# Patient Record
Sex: FEMALE | Race: BLACK OR AFRICAN AMERICAN | NOT HISPANIC OR LATINO
[De-identification: names, ages, dates, MRNs, and addresses within clinical notes are randomized per-mention and may not be internally consistent; named-entity substitution may affect disease eponyms.]

---

## 2018-08-10 ENCOUNTER — APPOINTMENT (OUTPATIENT)
Dept: DERMATOLOGY | Facility: CLINIC | Age: 48
End: 2018-08-10
Payer: COMMERCIAL

## 2018-08-10 VITALS
HEIGHT: 60 IN | SYSTOLIC BLOOD PRESSURE: 122 MMHG | DIASTOLIC BLOOD PRESSURE: 82 MMHG | BODY MASS INDEX: 40.44 KG/M2 | WEIGHT: 206 LBS

## 2018-08-10 DIAGNOSIS — L65.9 NONSCARRING HAIR LOSS, UNSPECIFIED: ICD-10-CM

## 2018-08-10 DIAGNOSIS — L30.9 DERMATITIS, UNSPECIFIED: ICD-10-CM

## 2018-08-10 PROCEDURE — 99203 OFFICE O/P NEW LOW 30 MIN: CPT

## 2018-08-10 RX ORDER — FLUOCINONIDE 0.5 MG/ML
0.05 SOLUTION TOPICAL
Qty: 1 | Refills: 1 | Status: ACTIVE | COMMUNITY
Start: 2018-08-10 | End: 1900-01-01

## 2018-08-10 RX ORDER — BETAMETHASONE DIPROPIONATE 0.5 MG/G
0.05 CREAM TOPICAL
Qty: 1 | Refills: 1 | Status: ACTIVE | COMMUNITY
Start: 2018-08-10 | End: 1900-01-01

## 2018-09-24 ENCOUNTER — APPOINTMENT (OUTPATIENT)
Dept: GASTROENTEROLOGY | Facility: CLINIC | Age: 48
End: 2018-09-24
Payer: COMMERCIAL

## 2018-09-24 VITALS
BODY MASS INDEX: 40.25 KG/M2 | WEIGHT: 205 LBS | TEMPERATURE: 98.5 F | SYSTOLIC BLOOD PRESSURE: 140 MMHG | DIASTOLIC BLOOD PRESSURE: 120 MMHG | HEIGHT: 60 IN

## 2018-09-24 DIAGNOSIS — Z12.11 ENCOUNTER FOR SCREENING FOR MALIGNANT NEOPLASM OF COLON: ICD-10-CM

## 2018-09-24 DIAGNOSIS — Z82.49 FAMILY HISTORY OF ISCHEMIC HEART DISEASE AND OTHER DISEASES OF THE CIRCULATORY SYSTEM: ICD-10-CM

## 2018-09-24 DIAGNOSIS — Z86.39 PERSONAL HISTORY OF OTHER ENDOCRINE, NUTRITIONAL AND METABOLIC DISEASE: ICD-10-CM

## 2018-09-24 DIAGNOSIS — Z80.0 FAMILY HISTORY OF MALIGNANT NEOPLASM OF DIGESTIVE ORGANS: ICD-10-CM

## 2018-09-24 DIAGNOSIS — Z86.79 PERSONAL HISTORY OF OTHER DISEASES OF THE CIRCULATORY SYSTEM: ICD-10-CM

## 2018-09-24 DIAGNOSIS — Z83.0 FAMILY HISTORY OF HUMAN IMMUNODEFICIENCY VIRUS [HIV] DISEASE: ICD-10-CM

## 2018-09-24 DIAGNOSIS — Z78.9 OTHER SPECIFIED HEALTH STATUS: ICD-10-CM

## 2018-09-24 DIAGNOSIS — Z87.09 PERSONAL HISTORY OF OTHER DISEASES OF THE RESPIRATORY SYSTEM: ICD-10-CM

## 2018-09-24 PROCEDURE — 99202 OFFICE O/P NEW SF 15 MIN: CPT

## 2018-09-24 RX ORDER — ALBUTEROL SULFATE 2.5 MG/3ML
(2.5 MG/3ML) VIAL, NEBULIZER (ML) INHALATION
Refills: 0 | Status: ACTIVE | COMMUNITY

## 2018-09-24 RX ORDER — ATENOLOL 100 MG/1
100 TABLET ORAL
Refills: 0 | Status: ACTIVE | COMMUNITY

## 2018-09-24 RX ORDER — SODIUM SULFATE, POTASSIUM SULFATE, MAGNESIUM SULFATE 17.5; 3.13; 1.6 G/ML; G/ML; G/ML
17.5-3.13-1.6 SOLUTION, CONCENTRATE ORAL
Qty: 1 | Refills: 0 | Status: ACTIVE | COMMUNITY
Start: 2018-09-24 | End: 1900-01-01

## 2018-09-24 RX ORDER — HYDROCHLOROTHIAZIDE 25 MG/1
25 TABLET ORAL
Refills: 0 | Status: ACTIVE | COMMUNITY

## 2018-09-24 RX ORDER — SIMVASTATIN 40 MG/1
40 TABLET, FILM COATED ORAL
Refills: 0 | Status: ACTIVE | COMMUNITY

## 2018-10-04 ENCOUNTER — APPOINTMENT (OUTPATIENT)
Dept: GASTROENTEROLOGY | Facility: AMBULATORY MEDICAL SERVICES | Age: 48
End: 2018-10-04
Payer: COMMERCIAL

## 2018-10-04 ENCOUNTER — OTHER (OUTPATIENT)
Age: 48
End: 2018-10-04

## 2018-10-04 PROCEDURE — 45380 COLONOSCOPY AND BIOPSY: CPT

## 2018-10-11 ENCOUNTER — OTHER (OUTPATIENT)
Age: 48
End: 2018-10-11

## 2018-10-13 ENCOUNTER — TRANSCRIPTION ENCOUNTER (OUTPATIENT)
Age: 48
End: 2018-10-13

## 2018-10-15 ENCOUNTER — TRANSCRIPTION ENCOUNTER (OUTPATIENT)
Age: 48
End: 2018-10-15

## 2021-07-19 ENCOUNTER — EMERGENCY (EMERGENCY)
Facility: HOSPITAL | Age: 51
LOS: 1 days | Discharge: HOME | End: 2021-07-19
Attending: EMERGENCY MEDICINE
Payer: COMMERCIAL

## 2021-07-19 VITALS
TEMPERATURE: 98 F | SYSTOLIC BLOOD PRESSURE: 172 MMHG | HEART RATE: 88 BPM | RESPIRATION RATE: 18 BRPM | OXYGEN SATURATION: 98 % | DIASTOLIC BLOOD PRESSURE: 100 MMHG

## 2021-07-19 VITALS
RESPIRATION RATE: 18 BRPM | DIASTOLIC BLOOD PRESSURE: 80 MMHG | OXYGEN SATURATION: 100 % | HEART RATE: 75 BPM | SYSTOLIC BLOOD PRESSURE: 145 MMHG | TEMPERATURE: 98 F

## 2021-07-19 DIAGNOSIS — Z90.711 ACQUIRED ABSENCE OF UTERUS WITH REMAINING CERVICAL STUMP: Chronic | ICD-10-CM

## 2021-07-19 DIAGNOSIS — Z98.89 OTHER SPECIFIED POSTPROCEDURAL STATES: Chronic | ICD-10-CM

## 2021-07-19 DIAGNOSIS — Z90.49 ACQUIRED ABSENCE OF OTHER SPECIFIED PARTS OF DIGESTIVE TRACT: Chronic | ICD-10-CM

## 2021-07-19 PROCEDURE — 99284 EMERGENCY DEPT VISIT MOD MDM: CPT

## 2021-07-19 PROCEDURE — 99283 EMERGENCY DEPT VISIT LOW MDM: CPT | Mod: 25

## 2021-07-19 PROCEDURE — 96372 THER/PROPH/DIAG INJ SC/IM: CPT

## 2021-07-19 RX ORDER — KETOROLAC TROMETHAMINE 30 MG/ML
30 SYRINGE (ML) INJECTION ONCE
Refills: 0 | Status: DISCONTINUED | OUTPATIENT
Start: 2021-07-19 | End: 2021-07-19

## 2021-07-19 RX ORDER — CYCLOBENZAPRINE HYDROCHLORIDE 10 MG/1
10 TABLET, FILM COATED ORAL ONCE
Refills: 0 | Status: COMPLETED | OUTPATIENT
Start: 2021-07-19 | End: 2021-07-19

## 2021-07-19 RX ORDER — CYCLOBENZAPRINE HYDROCHLORIDE 10 MG/1
1 TABLET, FILM COATED ORAL
Qty: 9 | Refills: 0
Start: 2021-07-19 | End: 2021-07-21

## 2021-07-19 RX ORDER — LIDOCAINE 4 G/100G
1 CREAM TOPICAL
Qty: 1 | Refills: 0
Start: 2021-07-19 | End: 2021-07-23

## 2021-07-19 RX ORDER — IBUPROFEN 200 MG
1 TABLET ORAL
Qty: 12 | Refills: 0
Start: 2021-07-19 | End: 2021-07-21

## 2021-07-19 RX ORDER — LIDOCAINE 4 G/100G
1 CREAM TOPICAL ONCE
Refills: 0 | Status: COMPLETED | OUTPATIENT
Start: 2021-07-19 | End: 2021-07-19

## 2021-07-19 RX ADMIN — Medication 30 MILLIGRAM(S): at 15:34

## 2021-07-19 RX ADMIN — CYCLOBENZAPRINE HYDROCHLORIDE 10 MILLIGRAM(S): 10 TABLET, FILM COATED ORAL at 14:34

## 2021-07-19 RX ADMIN — LIDOCAINE 1 PATCH: 4 CREAM TOPICAL at 14:34

## 2021-07-19 RX ADMIN — Medication 30 MILLIGRAM(S): at 14:34

## 2021-07-19 NOTE — ED PROVIDER NOTE - PHYSICAL EXAMINATION
GEN:   comfortable, in no apparent distress, AOx3  EYES:   PERRL, extra-occular movements intact  RESP:   clear to auscultation bilaterally, non-labored, speaking in full sentences  ABD:   soft, non tender, no guarding  :   no cva tenderness  MSK:   5/5 strength including lower legs, moving all extremities. positive left leg strait leg raise test.  SKIN:   dry, intact, no rash  NEURO:   AOx3, no focal weakness or loss of sensation, GCS 15  PSYCH: calm, cooperative, no apparent risk to self and others

## 2021-07-19 NOTE — ED PROVIDER NOTE - PATIENT PORTAL LINK FT
You can access the FollowMyHealth Patient Portal offered by [facility] by registering at the following website: [email or web address]. By joining [employer]’s FollowMyHealth portal, you will also be able to view your health information using other applications (apps) compatible with our system.

## 2021-07-19 NOTE — ED PROVIDER NOTE - CLINICAL SUMMARY MEDICAL DECISION MAKING FREE TEXT BOX
51 year old female history of htn, hld, DM, chronic low back pain presents for low back pain.  No red flags.  Meds, reassess, outpatient f/u.

## 2021-07-19 NOTE — ED PROVIDER NOTE - OBJECTIVE STATEMENT
51 year old female history of htn, hld, DM, chronic low back pain presents for low back pain.  Patient reports that it started at 1000 this morning and radiates down b/l posterior legs / buttocks, worse on left.  States she applied heat and took tylenol prior to arrival without much relief.  Patient denies recent heavy lifting or recent trauma.  Patient denies numbness in extremities, denies loss of bladder of bowel function, denies fevers or chills and denies saddle anesthesia, denies IVDU.  States feels similar to previous exacerbations of her lower back pain.

## 2021-07-19 NOTE — ED PROVIDER NOTE - PROGRESS NOTE DETAILS
Patient reports great improvement in pain, is able to ambulate with steady gait.  Patient nontoxic and medically stable for discharge. Return precautions provided and patient understands to return to the ED for concerning or worsening signs and symptoms. Instructed to follow up with spine and agreeable. Patient's questions answered.

## 2021-07-19 NOTE — ED PROVIDER NOTE - ATTENDING CONTRIBUTION TO CARE
seen with acp  c/o low back pain radiating down the left leg  able to ambulate  improved with toradol and cyclo benzaprine  agree with acps assessment hx and physical and disposition

## 2021-07-19 NOTE — ED PROVIDER NOTE - CARE PROVIDER_API CALL
Dean Lam (MD)  Orthopaedic Surgery  [address], [address]  [place] [address]  Phone: [phone]  Fax: [phone]  Follow Up Time:     John Justin; JOHANA; MS)  Neurosurgery  [address], [address]  [place] [address]  Phone: [phone]  Fax: [phone]  Follow Up Time:

## 2022-04-05 NOTE — ED ADULT TRIAGE NOTE - RESPIRATORY RATE (BREATHS/MIN)
Pt has had a hemodialysis cath in left arm since 2021; removed approx 2-3 months ago due to nonfunctioning of; per pt dialysis just started taking kaykay in that arm;  Pt unaware of what was pulled in dialysis today; 18

## 2023-07-19 PROBLEM — E11.9 TYPE 2 DIABETES MELLITUS WITHOUT COMPLICATIONS: Chronic | Status: ACTIVE | Noted: 2021-07-19

## 2023-07-19 PROBLEM — E78.5 HYPERLIPIDEMIA, UNSPECIFIED: Chronic | Status: ACTIVE | Noted: 2021-07-19

## 2023-07-19 PROBLEM — I10 ESSENTIAL (PRIMARY) HYPERTENSION: Chronic | Status: ACTIVE | Noted: 2021-07-19

## 2023-07-29 ENCOUNTER — NON-APPOINTMENT (OUTPATIENT)
Age: 53
End: 2023-07-29

## 2023-07-31 ENCOUNTER — APPOINTMENT (OUTPATIENT)
Dept: ORTHOPEDIC SURGERY | Facility: CLINIC | Age: 53
End: 2023-07-31
Payer: COMMERCIAL

## 2023-07-31 ENCOUNTER — NON-APPOINTMENT (OUTPATIENT)
Age: 53
End: 2023-07-31

## 2023-07-31 VITALS
BODY MASS INDEX: 36.52 KG/M2 | HEART RATE: 82 BPM | WEIGHT: 186 LBS | DIASTOLIC BLOOD PRESSURE: 88 MMHG | HEIGHT: 60 IN | SYSTOLIC BLOOD PRESSURE: 149 MMHG

## 2023-07-31 PROCEDURE — 72170 X-RAY EXAM OF PELVIS: CPT

## 2023-07-31 PROCEDURE — 99204 OFFICE O/P NEW MOD 45 MIN: CPT

## 2023-07-31 PROCEDURE — 72110 X-RAY EXAM L-2 SPINE 4/>VWS: CPT

## 2023-08-01 RX ORDER — METFORMIN HYDROCHLORIDE 500 MG/1
500 TABLET, COATED ORAL
Qty: 180 | Refills: 0 | Status: ACTIVE | COMMUNITY
Start: 2022-11-26

## 2023-08-01 RX ORDER — COVID-19 MOLECULAR TEST ASSAY
KIT MISCELLANEOUS
Qty: 8 | Refills: 0 | Status: ACTIVE | COMMUNITY
Start: 2023-03-16

## 2023-08-01 RX ORDER — LOSARTAN POTASSIUM 100 MG/1
100 TABLET, FILM COATED ORAL
Qty: 90 | Refills: 0 | Status: ACTIVE | COMMUNITY
Start: 2023-06-27

## 2023-08-01 RX ORDER — ATORVASTATIN CALCIUM 40 MG/1
40 TABLET, FILM COATED ORAL
Qty: 90 | Refills: 0 | Status: ACTIVE | COMMUNITY
Start: 2023-02-08

## 2023-08-01 RX ORDER — METFORMIN ER 500 MG 500 MG/1
500 TABLET ORAL
Qty: 180 | Refills: 0 | Status: ACTIVE | COMMUNITY
Start: 2023-03-16

## 2023-08-01 RX ORDER — BLOOD SUGAR DIAGNOSTIC
STRIP MISCELLANEOUS
Qty: 50 | Refills: 0 | Status: ACTIVE | COMMUNITY
Start: 2022-11-28

## 2023-08-01 RX ORDER — AMLODIPINE BESYLATE 10 MG/1
10 TABLET ORAL
Qty: 90 | Refills: 0 | Status: ACTIVE | COMMUNITY
Start: 2023-05-03

## 2023-08-01 RX ORDER — SITAGLIPTIN 100 MG/1
100 TABLET, FILM COATED ORAL
Qty: 90 | Refills: 0 | Status: ACTIVE | COMMUNITY
Start: 2023-04-23

## 2023-08-01 RX ORDER — SEMAGLUTIDE 0.68 MG/ML
2 INJECTION, SOLUTION SUBCUTANEOUS
Qty: 9 | Refills: 0 | Status: ACTIVE | COMMUNITY
Start: 2023-03-16

## 2023-08-01 RX ORDER — CHLORTHALIDONE 25 MG/1
25 TABLET ORAL
Qty: 90 | Refills: 0 | Status: ACTIVE | COMMUNITY
Start: 2023-02-16

## 2023-08-01 RX ORDER — GLIPIZIDE 5 MG/1
5 TABLET, FILM COATED, EXTENDED RELEASE ORAL
Qty: 90 | Refills: 0 | Status: ACTIVE | COMMUNITY
Start: 2023-04-15

## 2023-08-01 NOTE — PHYSICAL EXAM
[LE] : Sensory: Intact in bilateral lower extremities [Knee] : patellar 2+ and symmetric bilaterally [Ankle] : ankle 2+ and symmetric bilaterally [DP] : dorsalis pedis 2+ and symmetric bilaterally [Normal RLE] : Right Lower Extremity: No scars, rashes, lesions, ulcers, skin intact [Normal LLE] : Left Lower Extremity: No scars, rashes, lesions, ulcers, skin intact [Normal DTR Reflexes] : DTR reflexes normal [Normal Touch] : sensation intact for touch [Normal] : Gait: normal [Poor Appearance] : well-appearing [Acute Distress] : not in acute distress [Obese] : not obese [de-identified] : Negative FAbERs tenderness over the left greater trochanteric bursa [de-identified] : Heel and toe walk is intact Negative tension signs of bilateral LEs [de-identified] : xrays of the lumbar spine 4 views demonstrating good alignment on the AP view, lateral views of flexion and extension with no instability seen,  no obvious fractures, no bony tumors and no infection.  AP pelvis x-ray 1 view demonstrating well-preserved bilateral hip joints no obvious fractures.

## 2023-08-01 NOTE — DISCUSSION/SUMMARY
[Medication Risks Reviewed] : Medication risks reviewed [Surgical risks reviewed] : Surgical risks reviewed [2 Weeks] : in 2 weeks [de-identified] : Since patient has already undergone physical therapy recently and continues to do home guided exercises. Updated prescription for an MRI of the lumbar spine provided on today's office visit.  I, Dr. Og Lee, personally performed the evaluation and management (E/M) services for this new patient.  That E/M includes conducting the initial examination, assessing all conditions, and establishing a plan of care.  Today, my ACP, Katie Fisher, was here to observe my evaluation and management services for this patient to be followed going forward.
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
## 2023-09-09 ENCOUNTER — APPOINTMENT (OUTPATIENT)
Dept: ORTHOPEDIC SURGERY | Facility: CLINIC | Age: 53
End: 2023-09-09
Payer: COMMERCIAL

## 2023-09-09 VITALS — SYSTOLIC BLOOD PRESSURE: 147 MMHG | HEART RATE: 75 BPM | DIASTOLIC BLOOD PRESSURE: 90 MMHG

## 2023-09-09 PROCEDURE — 99214 OFFICE O/P EST MOD 30 MIN: CPT

## 2023-09-09 NOTE — HISTORY OF PRESENT ILLNESS
[Intermit.] : ~He/She~ states the symptoms seem to be intermittent [de-identified] : Patient presents a follow-up visit to review the results of a recent MRI of the Lumbar Spine. The patient continues to have pain pertaining to her lower back, mostly to her left side. The patient reports no significant changes to their symptoms since their last visit. The patient denies any symptoms of numbness, tingling, or weakness. She notes that her left knee has been buckling but has no falls reported. She also notes some stiffness. The patient currently takes no pain medication at this time.

## 2023-09-09 NOTE — ADDENDUM
[FreeTextEntry1] : I, Shine Pina Jr, acted solely as a scribe for Dr. Og Lee on this date 09/09/2023 .  All medical record entries made by the Scribe were at my, Dr. Og Lee, direction and personally dictated by me on 09/09/2023 . I have reviewed the chart and agree that the record accurately reflects my personal performance of the history, physical exam, assessment and plan. I have also personally directed, reviewed, and agreed with the chart.

## 2023-09-09 NOTE — DISCUSSION/SUMMARY
[Medication Risks Reviewed] : Medication risks reviewed [PRN] : PRN [de-identified] : I discussed the underlying pathophysiology of the patient's condition & MRI findings. I expressed to the patient that her symptoms are consistent with osteoarthritis across her lower back. Patient is not a surgical candidate at this time. The patient and I discussed her options regarding treatment. I advised that the patient should consult with a pain management doctor for alleviation of her symptoms. We discussed the patient's issues and talked about the benefits and risks of the injections. A prescription to a pain management specialist was provided to the patient. If the patient begins to experience any changes or severe exacerbation of her symptoms, she should reach out to me as soon as possible. Otherwise, She should return as needed.

## 2023-09-09 NOTE — PHYSICAL EXAM
[LE] : Sensory: Intact in bilateral lower extremities [Normal] : No costovertebral angle tenderness and no spinal tenderness [de-identified] : MRI Evaluation of the Lumbar Spine performed on 9/4/23 shows L3-4, L4-5, & L5-S1 disc herniations deforming the thecal sac abutting the proximal nerve roots bilaterally with bilateral neural foraminal extension also noted at each level abutting the exiting left L5 nerve root at the L5-S1 level, abutting the L4 nerve roots bilaterally at the L4-5 level with L4-5 moderate central spinal stenosis in conjunction with bilateral facet and ligamentous hypertrophy and Grade I Spondylolisthesis. T12-L1 disc bulge. Lumbar Spine curvature with leftward convexity.

## 2023-10-25 ENCOUNTER — APPOINTMENT (OUTPATIENT)
Dept: PAIN MANAGEMENT | Facility: CLINIC | Age: 53
End: 2023-10-25
Payer: COMMERCIAL

## 2023-10-25 VITALS — BODY MASS INDEX: 37.11 KG/M2 | WEIGHT: 189 LBS | HEIGHT: 60 IN

## 2023-10-25 PROCEDURE — 99214 OFFICE O/P EST MOD 30 MIN: CPT

## 2023-11-14 ENCOUNTER — APPOINTMENT (OUTPATIENT)
Dept: PAIN MANAGEMENT | Facility: CLINIC | Age: 53
End: 2023-11-14
Payer: COMMERCIAL

## 2023-11-14 PROCEDURE — 64483 NJX AA&/STRD TFRM EPI L/S 1: CPT | Mod: LT

## 2023-11-14 PROCEDURE — J3490M: CUSTOM

## 2023-11-14 PROCEDURE — 64484 NJX AA&/STRD TFRM EPI L/S EA: CPT | Mod: 59,LT

## 2023-11-29 ENCOUNTER — APPOINTMENT (OUTPATIENT)
Dept: PAIN MANAGEMENT | Facility: CLINIC | Age: 53
End: 2023-11-29
Payer: COMMERCIAL

## 2023-11-29 VITALS — HEIGHT: 60 IN | BODY MASS INDEX: 37.11 KG/M2 | WEIGHT: 189 LBS

## 2023-11-29 PROCEDURE — 99213 OFFICE O/P EST LOW 20 MIN: CPT

## 2024-03-25 ENCOUNTER — APPOINTMENT (OUTPATIENT)
Dept: PAIN MANAGEMENT | Facility: CLINIC | Age: 54
End: 2024-03-25
Payer: COMMERCIAL

## 2024-03-25 VITALS — BODY MASS INDEX: 37.11 KG/M2 | WEIGHT: 189 LBS | HEIGHT: 60 IN

## 2024-03-25 PROCEDURE — 99214 OFFICE O/P EST MOD 30 MIN: CPT

## 2024-03-25 NOTE — PHYSICAL EXAM
[de-identified] : Gen: NAD Head: NC/AT Eyes: no glasses, no scleral icterus ENT: mucous membranes moist CV: No JVD Lungs: nonlabored breathing Abd: soft, NT/ND Ext: full ROM in all extremities, no peripheral edema Back: +TTP in the bilateral low lumbar facet region, +SLR bilaterally, very limited extension 2/2 pain Neuro: CN intact LEs +5 L +5 R hip flexion +5 L +5 R leg extension +5 L +5 R leg flexion +5 L +5 R foot dorsiflexion +5 L +5 R foot plantarflexion +5 L +5 R EHL extension Psych: normal affect Skin: no visible lesions

## 2024-03-25 NOTE — HISTORY OF PRESENT ILLNESS
[Lower back] : lower back [Left Leg] : left leg [Sudden] : sudden [9] : 9 [Burning] : burning [Radiating] : radiating [Household chores] : household chores [Constant] : constant [Leisure] : leisure [Work] : work [Injection therapy] : injection therapy [Sitting] : sitting [Standing] : standing [Walking] : walking [Bending forward] : bending forward [Extending back] : extending back [Exercising] : exercising [Stairs] : stairs [Full time] : Work status: full time [FreeTextEntry1] : 3/25/2024: TIMOTEO MATA is a 54 year-old woman presenting for a NPV (Dr. Suero) for a history of chronic low back pain with radicular features. The patient notes a history of low back pain starting in July of 2023. The patient underwent a LEFT L4-L5, L5-S1 TFESI on 11/14/2023 which helped significantly with her low back and lower extremity pain. She notes 80% relief of pain and improvement in function for approximately 3+ months. She was able to return to normal function. However, approximately two weeks ago, she notes having recurrence of an aching pain in the low lumbar region with radiation to the bilateral anterolateral thighs to the anterior legs. No focal numbness or weakness in the lower extremities. No bowel or bladder incontinence or saddle anesthesia.  Pain is worse with standing and walking for long periods as well as transitioning from sitting to standing. She has been taking ibuprofen prn for her pain.  The patient states that average pain over the past week was 9/10 in severity. Mood: Patient notes having a history of depression/anxiety but is not actively depressed. Sleep: Patient notes difficulty with sleep 2/2 pain.    Dr. Suero: Interval HPI 11/29/2023 Pt returns s/p Left L4-L5, L5-S1 TFESI on 11/14/23 with 80% relief and improvement of ADLs. Patient denies loss of bowel/bladder function, new motor deficits, fevers, or chills.  Initial HPI 10/25/23 Ms. MATA is a 53 year old F who presents for initial evaluation for low back and leg pain. Pain started 8 weeks ago and pain rated 7/10 or higher. It is not associated with any inciting injury. Pain radiates to down the L leg. Pain is described as shooting and electric shock when radiating. Pain is worsened with sitting, coughing and bearing down. Patient has failed conservative treatment with acetaminophen, NSAIDs, muscle relaxants, and physical therapy/HEP. Pain is causing significant functional limitation resulting in diminished quality of life and impaired age appropriate ADL's. Patient denies loss of bowel/bladder function, new motor deficits, fevers, or chills. There is associated numbness/tingling. [] : no [FreeTextEntry7] : Left leg

## 2024-03-25 NOTE — DATA REVIEWED
[MRI] : MRI [Lumbar Spine] : lumbar spine [Report was reviewed and noted in the chart] : The report was reviewed and noted in the chart [I independently reviewed and interpreted images and report] : I independently reviewed and interpreted images and report [FreeTextEntry1] : 8/26/2023 (STAND UP) INTERPRETATION: At the L5/S1 disc space level, disc herniation is noted deforming the thecal sac abutting the proximal S1 nerve roots bilaterally with bilateral neural foraminal extension, left greater than right, abutting the exiting left L5 nerve root. Signal elevation is noted in the posterior margin of annulus fibrosus associated with annular fissure. Bilateral facet and ligamentous hypertrophy is noted. Loss of disc signal with preservation of disc space height.  At L4/5, disc herniation is noted with Grade I spondylolisthesis deforming the thecal sac abutting the proximal L5 nerve roots bilaterally with bilateral neural foraminal extension abutting the exiting L4 nerve roots contributing to moderate central spinal stenosis in conjunction with bilateral facet and ligamentous hypertrophy. There is no evidence of bone marrow edema identified within the pars regions bilaterally to indicate an acute process. Partial loss of disc signal with preservation of disc space height.  At L3/4, disc herniation is noted deforming the thecal sac abutting the proximal L4 nerve roots bilaterally with bilateral inferior neural foraminal extension. Loss of disc signal is noted with preservation of disc space height. Signal elevation is noted in the posterior margin of annulus fibrosus associated with annular fissure.  At L2/3, there is no evidence of herniated disc, spinal canal compromise, neural foraminal stenosis, lateral recess encroachment, or loss of disc space height or signal.  At L1/2, there is no evidence of herniated disc, spinal canal compromise, neural foraminal stenosis, lateral recess encroachment, or loss of disc space height or signal.  At T12/L1, disc bulge is noted deforming the thecal sac. There is no evidence of neural foraminal extension. Preservation of disc space height and signal is noted.  There is only limited assessment provided of the T11/12 and T10/11 disc spaces at peripheral margin of included field-of-view.  Lumbar spine curvature is noted with leftward convexity.  Nonspecific dependent soft tissue edema is noted in the posterior subcutaneous tissues.  Hemangiomata are noted within the T12 and L1 vertebral bodies.  There is no evidence of lumbar vertebral body compression fracture. There is no evidence of bone marrow infiltrative disorder. There is no evidence of signal abnormality within the conus medullaris which is located at the approximate T12/L1 disc space level.  IMPRESSION:  * L3/4, L4/5, and L5/S1 disc herniations deforming the thecal sac abutting the proximal nerve roots bilaterally with bilateral neural foraminal extension also noted at each level abutting the exiting left L5 nerve root at the L5/S1 level, abutting the exiting L4 nerve roots bilaterally at the L4/5 level with L4/5 moderate central spinal stenosis in conjunction with bilateral facet and ligamentous hypertrophy and Grade I spondylolisthesis.  * T12/L1 disc bulge.  * Lumbar spine curvature with leftward convexity.

## 2024-03-25 NOTE — DISCUSSION/SUMMARY
[de-identified] : TIMOTEO MATA is a 54 year-old woman presenting for a NPV for a history of chronic low back pain with radicular features.  Prior treatment: 11/14/2023: LEFT L4-L5, L5-S1 TFESI w/ 80% relief of pain and improvement in function Physical therapy x2 months OTC NSAIDs Rest, heat, ice Patient has participated and failed at least 6 weeks of conservative therapy including physical therapy and a prescribed home exercise program as well as 6 weeks of activity modifications, including heat, ice, rest, and over the counter medications.  Plan: 1) MRI lumbar spine images reviewed with the patient. 2) Schedule BILATERAL L3-L4 TFESI w/o MAC. The procedure was explained to the patient in detail. Reviewed risks, benefits, and alternatives with the patient. Some risks discussed included temporary increase in pain, bleeding, infection, and side effects from steroids. The patient expressed understanding and would like to proceed.  3) Trial celecoxib 200mg BID prn; Discussed the risks of NSAIDs, including worsening HTN, cardiovascular risks, GI risk, renal injury. The patient was told not to take other NSAIDs with this and to consult with their PCP if there is a significant medical history to warrant this prior to initiating treatment.  4) Trial methocarbamol 750mg BID prn; Discussed AEs, including sedation, dizziness, somnolence. Patient told to use caution when driving after taking the first few doses. 5) Continue home exercise regimen 6) RTC post procedure

## 2024-03-26 RX ORDER — METHOCARBAMOL 750 MG/1
750 TABLET, FILM COATED ORAL
Qty: 60 | Refills: 1 | Status: ACTIVE | COMMUNITY
Start: 2024-03-25 | End: 1900-01-01

## 2024-04-05 ENCOUNTER — APPOINTMENT (OUTPATIENT)
Dept: PAIN MANAGEMENT | Facility: CLINIC | Age: 54
End: 2024-04-05
Payer: COMMERCIAL

## 2024-04-05 PROCEDURE — 64483 NJX AA&/STRD TFRM EPI L/S 1: CPT | Mod: 50

## 2024-04-05 NOTE — PROCEDURE
[FreeTextEntry1] : BILATERAL L3-L4 transforaminal epidural steroid injection [FreeTextEntry2] : chronic low back pain with radicular features [FreeTextEntry3] : Procedure Date: 04/05/2024   Preoperative Diagnosis: chronic low back pain with bilateral sciatica   Procedure: BILATERAL L3-L4 transforaminal epidural steroid injection under fluoroscopic guidance   Anesthesia: local   Complications: none   EBL: none   Procedure in detail: Patient was seen and examined. Risks, benefits, and alternatives for the procedure were discussed with the patient in detail. The patient expressed understanding, gave written and verbal consent, and placed themselves in a prone position on the procedure table. Skin overlying the lumbosacral spine was prepped with chloraprep and draped in the usual sterile fashion. Fluoroscopic images were obtained to identify the L3 vertebral body. Target was the 6 o'clock position under the right and left pedicle at the L3 vertebral body. Skin overlying the targets was marked and infiltrated with 1% lidocaine. Using two 22 gauge, 5 inch spinal needles, these were inserted and advanced under intermittent fluoroscopic guidance. When felt to be engaged in the epidural space, lateral view was used to confirm depth. After negative aspiration for heme/csf, contrast was injected under live fluoroscopy through each needle, both of which showed contrast spread consistent with epidural flow. No evidence of intravascular or intrathecal uptake. At this point, a total of 2ml of injectate, which consisted of 1ml of 10mg/ml dexamethasone and 1ml of normal saline was injected through each needle. The needles were restyletted and withdrawn, a band aid was placed over the injection site. Patient tolerated the procedure well. The patient recovered uneventfully and was discharged home in stable condition.

## 2024-04-18 ENCOUNTER — APPOINTMENT (OUTPATIENT)
Dept: PAIN MANAGEMENT | Facility: CLINIC | Age: 54
End: 2024-04-18
Payer: COMMERCIAL

## 2024-04-18 VITALS — WEIGHT: 189 LBS | HEIGHT: 60 IN | BODY MASS INDEX: 37.11 KG/M2

## 2024-04-18 PROCEDURE — 99214 OFFICE O/P EST MOD 30 MIN: CPT

## 2024-04-18 NOTE — DISCUSSION/SUMMARY
[de-identified] : TIMOTEO MATA is a 54 year-old woman presenting for a RPV for a history of chronic low back pain with radicular features.  Prior treatment: 4/5/2024: BILATERAL L3-L4 TFESI w/ 50% improvement of pain and function 11/14/2023: LEFT L4-L5, L5-S1 TFESI w/ 80% relief of pain and improvement in function Physical therapy x2 months OTC NSAIDs Rest, heat, ice Patient has participated and failed at least 6 weeks of conservative therapy including physical therapy and a prescribed home exercise program as well as 6 weeks of activity modifications, including heat, ice, rest, and over the counter medications.  Plan: 1) MRI lumbar spine images reviewed with the patient. 2) Schedule LEFT L5-S1, S1 TFESI w/o MAC. The procedure was explained to the patient in detail. Reviewed risks, benefits, and alternatives with the patient. Some risks discussed included temporary increase in pain, bleeding, infection, and side effects from steroids. The patient expressed understanding and would like to proceed.  3) Continue celecoxib 200mg BID prn; patient notes that this does help with pain and denies AEs 4) Continue methocarbamol 750mg BID prn; denies AEs 5) Continue home exercise regimen 6) RTC post procedure

## 2024-04-18 NOTE — PHYSICAL EXAM
[de-identified] : Gen: NAD Head: NC/AT Eyes: no glasses, no scleral icterus ENT: mucous membranes moist CV: No JVD Lungs: nonlabored breathing Abd: soft, NT/ND Ext: full ROM in all extremities, no peripheral edema Back: +TTP in the bilateral low lumbar facet region, +SLR on the LEFT, very limited extension 2/2 pain Neuro: CN intact LEs +5 L +5 R hip flexion +5 L +5 R leg extension +5 L +5 R leg flexion +5 L +5 R foot dorsiflexion +5 L +5 R foot plantarflexion +5 L +5 R EHL extension Psych: normal affect Skin: no visible lesions

## 2024-04-18 NOTE — HISTORY OF PRESENT ILLNESS
[Lower back] : lower back [Left Leg] : left leg [Sudden] : sudden [9] : 9 [Burning] : burning [Radiating] : radiating [Constant] : constant [Household chores] : household chores [Leisure] : leisure [Work] : work [Injection therapy] : injection therapy [Sitting] : sitting [Standing] : standing [Walking] : walking [Bending forward] : bending forward [Extending back] : extending back [Exercising] : exercising [Stairs] : stairs [Full time] : Work status: full time [FreeTextEntry1] : 4/18/2024: TIMOTEO MATA is a 54 year-old woman presenting for a RPV for a history of chronic low back pain with radicular features. The patient underwent a BILATERAL L3-L4 TFESI w/o MAC on 4/5/2024. She was also prescribed celecoxib and methocarbamol. The patient notes that she has had 50% relief of pain in the back and lower extremities since the procedures. She has had some improvement of function since the procedure. The patient describes a pain in the left low lumbosacral region with radiation to the left gluteal region and posterior thigh and posterolateral leg to the lateral foot. She notes intermittent tingling and numbness over this distribution with no focal weakness. No bowel or bladder incontinence or saddle anesthesia.  The patient notes that she has increased pain with sitting for 10-15 minutes and needs to stand up and walk around to alleviate her pain.  The patient states that average pain over the past week was 9/10 in severity. Mood: Patient denies anxiety/depression at this time.  Sleep: Patient denies difficulty with sleep 2/2 pain.   3/25/2024: TIMOTEO MATA is a 54 year-old woman presenting for a NPV (Dr. Suero) for a history of chronic low back pain with radicular features. The patient notes a history of low back pain starting in July of 2023. The patient underwent a LEFT L4-L5, L5-S1 TFESI on 11/14/2023 which helped significantly with her low back and lower extremity pain. She notes 80% relief of pain and improvement in function for approximately 3+ months. She was able to return to normal function. However, approximately two weeks ago, she notes having recurrence of an aching pain in the low lumbar region with radiation to the bilateral anterolateral thighs to the anterior legs. No focal numbness or weakness in the lower extremities. No bowel or bladder incontinence or saddle anesthesia.  Pain is worse with standing and walking for long periods as well as transitioning from sitting to standing. She has been taking ibuprofen prn for her pain.  The patient states that average pain over the past week was 9/10 in severity. Mood: Patient notes having a history of depression/anxiety but is not actively depressed. Sleep: Patient notes difficulty with sleep 2/2 pain.    Dr. Suero: Interval HPI 11/29/2023 Pt returns s/p Left L4-L5, L5-S1 TFESI on 11/14/23 with 80% relief and improvement of ADLs. Patient denies loss of bowel/bladder function, new motor deficits, fevers, or chills.  Initial HPI 10/25/23 Ms. MATA is a 53 year old F who presents for initial evaluation for low back and leg pain. Pain started 8 weeks ago and pain rated 7/10 or higher. It is not associated with any inciting injury. Pain radiates to down the L leg. Pain is described as shooting and electric shock when radiating. Pain is worsened with sitting, coughing and bearing down. Patient has failed conservative treatment with acetaminophen, NSAIDs, muscle relaxants, and physical therapy/HEP. Pain is causing significant functional limitation resulting in diminished quality of life and impaired age appropriate ADL's. Patient denies loss of bowel/bladder function, new motor deficits, fevers, or chills. There is associated numbness/tingling. [] : no [FreeTextEntry7] : Left leg

## 2024-05-14 ENCOUNTER — APPOINTMENT (OUTPATIENT)
Dept: PAIN MANAGEMENT | Facility: CLINIC | Age: 54
End: 2024-05-14
Payer: COMMERCIAL

## 2024-05-14 PROCEDURE — 64484 NJX AA&/STRD TFRM EPI L/S EA: CPT | Mod: LT

## 2024-05-14 PROCEDURE — 64483 NJX AA&/STRD TFRM EPI L/S 1: CPT | Mod: LT

## 2024-05-14 NOTE — PROCEDURE
[FreeTextEntry1] : L5-S1, S1 transforaminal epidural steroid injection [FreeTextEntry2] : chronic lumbar radiculopathy [FreeTextEntry3] : Procedure Date: 05/14/2024   Preoperative Diagnosis: chronic low back pain with LEFT sided sciatica   Procedure: LEFT L5-S1, S1 transforaminal epidural steroid injection under fluoroscopic guidance   Anesthesia: local   Complications: none   EBL: none   Procedure in detail: Patient was seen and examined. Risks, benefits, and alternatives for the procedure were discussed with the patient in detail. The patient expressed understanding, gave written and verbal consent, and placed themselves in a prone position on the procedure table. Skin overlying the lumbosacral spine was prepped with chloraprep and draped in the usual sterile fashion. Fluoroscopic images were obtained to identify the L5 and S1 vertebral body. Target was the 6 o'clock position under the LEFT pedicle at both the L5 and S1 vertebral bodies. Skin overlying the targets was marked and infiltrated with 1% lidocaine. Using two 25 gauge, 5 inch spinal needles, these were inserted and advanced under intermittent fluoroscopic guidance. When felt to be engaged in the epidural space, lateral view was used to confirm depth. After negative aspiration for heme/csf, contrast was injected under live fluoroscopy through each needle, both of which showed contrast spread consistent with epidural flow. No evidence of intravascular or intrathecal uptake. At this point, a total of 2ml of injectate, which consisted of 1ml of 6mg/ml betamethasone and 1ml of normal saline was injected through each needle. The needles were restyletted and withdrawn, a band aid was placed over the injection site. Patient tolerated the procedure well. The patient recovered uneventfully and was discharged home in stable condition.

## 2024-05-20 ENCOUNTER — RX RENEWAL (OUTPATIENT)
Age: 54
End: 2024-05-20

## 2024-05-28 PROBLEM — M54.50 ACUTE LOW BACK PAIN, UNSPECIFIED BACK PAIN LATERALITY, UNSPECIFIED WHETHER SCIATICA PRESENT: Status: ACTIVE | Noted: 2023-07-31

## 2024-05-30 ENCOUNTER — APPOINTMENT (OUTPATIENT)
Dept: PAIN MANAGEMENT | Facility: CLINIC | Age: 54
End: 2024-05-30
Payer: COMMERCIAL

## 2024-05-30 VITALS — WEIGHT: 188 LBS | HEIGHT: 60 IN | BODY MASS INDEX: 36.91 KG/M2

## 2024-05-30 DIAGNOSIS — M54.50 LOW BACK PAIN, UNSPECIFIED: ICD-10-CM

## 2024-05-30 PROCEDURE — 99214 OFFICE O/P EST MOD 30 MIN: CPT

## 2024-05-30 NOTE — DISCUSSION/SUMMARY
[de-identified] : TIMOTEO MATA is a 54 year-old woman presenting for a RPV for a history of chronic low back pain with radicular features.  Prior treatment: 5/14/2024 LEFT L5-S1, S1 TFESI w/o MAC 60% improvement of pain and function 4/5/2024: BILATERAL L3-L4 TFESI w/ 50% improvement of pain and function 11/14/2023: LEFT L4-L5, L5-S1 TFESI w/ 80% relief of pain and improvement in function Physical therapy x2 months OTC NSAIDs Rest, heat, ice Patient has participated and failed at least 6 weeks of conservative therapy including physical therapy and a prescribed home exercise program as well as 6 weeks of activity modifications, including heat, ice, rest, and over the counter medications.  Plan: 1) MRI lumbar spine images reviewed with the patient. 2) Consider repeat LEFT L5-S1, S1 TFESI w/o MAC 3) Discussed a LEFT L3, L4, L5 MBB; will consider this moving forward 4) Discussed a TPI in the thoracic paraspinal region 5) Continue celecoxib 200mg BID prn; patient notes that this does help with pain and denies AEs 6) Continue methocarbamol 750mg BID prn; denies AEs 7) Continue home exercise regimen 8) RTC 6 weeks

## 2024-05-30 NOTE — HISTORY OF PRESENT ILLNESS
[Lower back] : lower back [Left Leg] : left leg [Sudden] : sudden [9] : 9 [Burning] : burning [Radiating] : radiating [Constant] : constant [Household chores] : household chores [Leisure] : leisure [Work] : work [Injection therapy] : injection therapy [Sitting] : sitting [Standing] : standing [Walking] : walking [Bending forward] : bending forward [Extending back] : extending back [Exercising] : exercising [Stairs] : stairs [Full time] : Work status: full time [7] : 7 [3] : 3 [FreeTextEntry1] : 5/30/2024 TIMOTEO MATA is a 54 year-old woman presenting for a RPV for a history of chronic low back pain with radicular features. The patient underwent a LEFT L5-S1, S1 TFESI w/o MAC on 5/14/2024. The patient notes that she has had 60% improvement of pain since the procedure. She notes having significant relief of radicular pain since the procedure. She still has some aching pain in the left low back. No focal numbness or weakness in the lower extremities. No bowel or bladder incontinence or saddle anesthesia. Patient is complaining of numbness and tingling of the supralateral aspect of the right thigh.  The patient states that average pain over the past week was 5/10 in severity. Mood: Patient denies anxiety/depression at this time.  Sleep: Patient denies difficulty with sleep 2/2 pain.  4/18/2024: TIMOTEO MATA is a 54 year-old woman presenting for a RPV for a history of chronic low back pain with radicular features. The patient underwent a BILATERAL L3-L4 TFESI w/o MAC on 4/5/2024. She was also prescribed celecoxib and methocarbamol. The patient notes that she has had 50% relief of pain in the back and lower extremities since the procedures. She has had some improvement of function since the procedure. The patient describes a pain in the left low lumbosacral region with radiation to the left gluteal region and posterior thigh and posterolateral leg to the lateral foot. She notes intermittent tingling and numbness over this distribution with no focal weakness. No bowel or bladder incontinence or saddle anesthesia.  The patient notes that she has increased pain with sitting for 10-15 minutes and needs to stand up and walk around to alleviate her pain.  The patient states that average pain over the past week was 9/10 in severity. Mood: Patient denies anxiety/depression at this time.  Sleep: Patient denies difficulty with sleep 2/2 pain.   3/25/2024: TIMOTEO MATA is a 54 year-old woman presenting for a NPV (Dr. Suero) for a history of chronic low back pain with radicular features. The patient notes a history of low back pain starting in July of 2023. The patient underwent a LEFT L4-L5, L5-S1 TFESI on 11/14/2023 which helped significantly with her low back and lower extremity pain. She notes 80% relief of pain and improvement in function for approximately 3+ months. She was able to return to normal function. However, approximately two weeks ago, she notes having recurrence of an aching pain in the low lumbar region with radiation to the bilateral anterolateral thighs to the anterior legs. No focal numbness or weakness in the lower extremities. No bowel or bladder incontinence or saddle anesthesia.  Pain is worse with standing and walking for long periods as well as transitioning from sitting to standing. She has been taking ibuprofen prn for her pain.  The patient states that average pain over the past week was 9/10 in severity. Mood: Patient notes having a history of depression/anxiety but is not actively depressed. Sleep: Patient notes difficulty with sleep 2/2 pain.    Dr. Suero: Interval HPI 11/29/2023 Pt returns s/p Left L4-L5, L5-S1 TFESI on 11/14/23 with 80% relief and improvement of ADLs. Patient denies loss of bowel/bladder function, new motor deficits, fevers, or chills.  Initial HPI 10/25/23 Ms. MATA is a 53 year old F who presents for initial evaluation for low back and leg pain. Pain started 8 weeks ago and pain rated 7/10 or higher. It is not associated with any inciting injury. Pain radiates to down the L leg. Pain is described as shooting and electric shock when radiating. Pain is worsened with sitting, coughing and bearing down. Patient has failed conservative treatment with acetaminophen, NSAIDs, muscle relaxants, and physical therapy/HEP. Pain is causing significant functional limitation resulting in diminished quality of life and impaired age appropriate ADL's. Patient denies loss of bowel/bladder function, new motor deficits, fevers, or chills. There is associated numbness/tingling. [] : no [FreeTextEntry7] : Left leg

## 2024-05-30 NOTE — PHYSICAL EXAM
[de-identified] : Gen: NAD Head: NC/AT Eyes: no glasses, no scleral icterus ENT: mucous membranes moist CV: No JVD Lungs: nonlabored breathing Abd: soft, NT/ND Ext: full ROM in all extremities, no peripheral edema Back: +TTP in the bilateral low lumbar facet region, +SLR on the LEFT, very limited extension 2/2 pain Neuro: CN intact LEs +5 L +5 R hip flexion +5 L +5 R leg extension +5 L +5 R leg flexion +5 L +5 R foot dorsiflexion +5 L +5 R foot plantarflexion +5 L +5 R EHL extension Psych: normal affect Skin: no visible lesions

## 2024-06-01 ENCOUNTER — TRANSCRIPTION ENCOUNTER (OUTPATIENT)
Age: 54
End: 2024-06-01

## 2024-06-19 ENCOUNTER — RX RENEWAL (OUTPATIENT)
Age: 54
End: 2024-06-19

## 2024-06-19 RX ORDER — CELECOXIB 200 MG/1
200 CAPSULE ORAL
Qty: 180 | Refills: 0 | Status: ACTIVE | COMMUNITY
Start: 2024-03-25 | End: 1900-01-01

## 2024-07-01 ENCOUNTER — APPOINTMENT (OUTPATIENT)
Dept: PAIN MANAGEMENT | Facility: CLINIC | Age: 54
End: 2024-07-01
Payer: COMMERCIAL

## 2024-07-01 VITALS — BODY MASS INDEX: 36.71 KG/M2 | WEIGHT: 187 LBS | HEIGHT: 60 IN

## 2024-07-01 DIAGNOSIS — M54.17 RADICULOPATHY, LUMBOSACRAL REGION: ICD-10-CM

## 2024-07-01 DIAGNOSIS — M48.062 SPINAL STENOSIS, LUMBAR REGION WITH NEUROGENIC CLAUDICATION: ICD-10-CM

## 2024-07-01 DIAGNOSIS — M79.18 MYALGIA, OTHER SITE: ICD-10-CM

## 2024-07-01 PROCEDURE — 99213 OFFICE O/P EST LOW 20 MIN: CPT

## 2024-07-11 ENCOUNTER — APPOINTMENT (OUTPATIENT)
Dept: PAIN MANAGEMENT | Facility: CLINIC | Age: 54
End: 2024-07-11

## 2024-08-26 ENCOUNTER — APPOINTMENT (OUTPATIENT)
Dept: PAIN MANAGEMENT | Facility: CLINIC | Age: 54
End: 2024-08-26
Payer: COMMERCIAL

## 2024-08-26 VITALS — WEIGHT: 186 LBS | BODY MASS INDEX: 36.52 KG/M2 | HEIGHT: 60 IN

## 2024-08-26 DIAGNOSIS — M48.062 SPINAL STENOSIS, LUMBAR REGION WITH NEUROGENIC CLAUDICATION: ICD-10-CM

## 2024-08-26 DIAGNOSIS — M79.10 MYALGIA, UNSPECIFIED SITE: ICD-10-CM

## 2024-08-26 DIAGNOSIS — M79.18 MYALGIA, OTHER SITE: ICD-10-CM

## 2024-08-26 DIAGNOSIS — M54.17 RADICULOPATHY, LUMBOSACRAL REGION: ICD-10-CM

## 2024-08-26 PROCEDURE — 20552 NJX 1/MLT TRIGGER POINT 1/2: CPT

## 2024-08-26 PROCEDURE — 99214 OFFICE O/P EST MOD 30 MIN: CPT | Mod: 25

## 2024-08-26 NOTE — DISCUSSION/SUMMARY
[de-identified] : TIMOTEO MATA is a 54 year-old woman presenting for a RPV for a history of chronic low back pain with radicular features.  Prior treatment: 5/14/2024: LEFT L5-S1, S1 TFESI w/o MAC 60% improvement of pain and function for 3+ months 4/5/2024: BILATERAL L3-L4 TFESI w/ 50% improvement of pain and function 11/14/2023: LEFT L4-L5, L5-S1 TFESI w/ 80% relief of pain and improvement in function Physical therapy x2 months OTC NSAIDs Rest, heat, ice Patient has participated and failed at least 6 weeks of conservative therapy including physical therapy and a prescribed home exercise program as well as 6 weeks of activity modifications, including heat, ice, rest, and over the counter medications.  Plan: 1) MRI lumbar spine images reviewed with the patient. 2) Schedule LEFT L5-S1, S1 TFESI w/o MAC. The procedure was explained to the patient in detail. Reviewed risks, benefits, and alternatives with the patient. Some risks discussed included temporary increase in pain, bleeding, infection, and side effects from steroids. The patient expressed understanding and would like to proceed.  3) TPI today in the left lumbar paraspinal region 4) Discussed a LEFT L3, L4, L5 MBB; will consider this moving forward 5) Discussed a TPI in the thoracic paraspinal region--will defer at this time 6) Continue celecoxib 200mg BID prn; patient notes that this does help with pain and denies AEs 7) Continue methocarbamol 750mg BID prn; denies AEs 8) Continue physical therapy and home exercise regimen 9) RTC post procedure

## 2024-08-26 NOTE — PHYSICAL EXAM
[de-identified] : Gen: NAD Head: NC/AT Eyes: no glasses, no scleral icterus ENT: mucous membranes moist CV: No JVD Lungs: nonlabored breathing Abd: soft, NT/ND Ext: full ROM in all extremities, no peripheral edema Back: +TTP in the bilateral low lumbar facet region, +SLR on the LEFT, very limited extension 2/2 pain Neuro: CN intact LEs +5 L +5 R hip flexion +5 L +5 R leg extension +5 L +5 R leg flexion +5 L +5 R foot dorsiflexion +5 L +5 R foot plantarflexion +5 L +5 R EHL extension Psych: normal affect Skin: no visible lesions

## 2024-08-26 NOTE — PROCEDURE
[Trigger point 1-2 muscle groups] : trigger point 1-2 muscle groups [Left] : of the left [Lumbar paraspinal muscle] : lumbar paraspinal muscle [Pain] : pain [Alcohol] : alcohol [Ethyl Chloride sprayed topically] : ethyl chloride sprayed topically [___ cc    1%] : Lidocaine ~Vcc of 1%  [___ cc    10mg] : Triamcinolone (Kenalog) ~Vcc of 10 mg  [] : Patient tolerated procedure well [Previous OTC use and PT nontherapeutic] : patient has tried OTC's including aspirin, Ibuprofen, Aleve, etc or prescription NSAIDS, and/or exercises at home and/or physical therapy without satisfactory response [Risks, benefits, alternatives discussed / Verbal consent obtained] : the risks benefits, and alternatives have been discussed, and verbal consent was obtained

## 2024-08-26 NOTE — HISTORY OF PRESENT ILLNESS
Radial band applied to the right radial artery.  [Lower back] : lower back [Left Leg] : left leg [Sudden] : sudden [Burning] : burning [Radiating] : radiating [Constant] : constant [Household chores] : household chores [Leisure] : leisure [Work] : work [Injection therapy] : injection therapy [Sitting] : sitting [Standing] : standing [Walking] : walking [Extending back] : extending back [Bending forward] : bending forward [Exercising] : exercising [Stairs] : stairs [Full time] : Work status: full time [10] : 10 [6] : 6 [FreeTextEntry1] : 8/26/2024: TIMOTEO MATA is a 54 year-old woman presenting for a RPV for a history of chronic low back pain with radicular features. The patient notes 3+ months of relief from her LEFT L5-S1, S1 TFESI on 5/14/2024. However, the patient noted recurrence of pain yesterday and today. She describes an aching, throbbing pain in the left low back with radiation to the left gluteal region and posterior thigh and leg. No focal numbness or weakness in the lower extremities. No bowel or bladder incontinence or saddle anesthesia. The patient states that average pain over the past week was 8/10 in severity. Mood: Patient denies anxiety/depression at this time.  Sleep: Patient denies difficulty with sleep 2/2 pain.  7/1/2024 TIMOTEO MATA is a 54 year-old woman presenting for a RPV for a history of chronic low back pain with radicular features. The patient notes that she has had continued improvement of pain in the low back. She continues to note relief from her LEFT L5-S1, S1 TFESI w/o MAC on 5/14/2024. She has been able to increase her exercise and has been able to start running.  The patient states that average pain over the past week was 1/10 in severity. Mood: Patient denies anxiety/depression at this time.  Sleep: Patient denies difficulty with sleep 2/2 pain.  5/30/2024 TIMOTEO MATA is a 54 year-old woman presenting for a RPV for a history of chronic low back pain with radicular features. The patient underwent a LEFT L5-S1, S1 TFESI w/o MAC on 5/14/2024. The patient notes that she has had 60% improvement of pain since the procedure. She notes having significant relief of radicular pain since the procedure. She still has some aching pain in the left low back. No focal numbness or weakness in the lower extremities. No bowel or bladder incontinence or saddle anesthesia. Patient is complaining of numbness and tingling of the supralateral aspect of the right thigh.  The patient states that average pain over the past week was 5/10 in severity. Mood: Patient denies anxiety/depression at this time.  Sleep: Patient denies difficulty with sleep 2/2 pain.  4/18/2024: TIMOTEO MATA is a 54 year-old woman presenting for a RPV for a history of chronic low back pain with radicular features. The patient underwent a BILATERAL L3-L4 TFESI w/o MAC on 4/5/2024. She was also prescribed celecoxib and methocarbamol. The patient notes that she has had 50% relief of pain in the back and lower extremities since the procedures. She has had some improvement of function since the procedure. The patient describes a pain in the left low lumbosacral region with radiation to the left gluteal region and posterior thigh and posterolateral leg to the lateral foot. She notes intermittent tingling and numbness over this distribution with no focal weakness. No bowel or bladder incontinence or saddle anesthesia.  The patient notes that she has increased pain with sitting for 10-15 minutes and needs to stand up and walk around to alleviate her pain.  The patient states that average pain over the past week was 9/10 in severity. Mood: Patient denies anxiety/depression at this time.  Sleep: Patient denies difficulty with sleep 2/2 pain.   3/25/2024: TIMOTEO MATA is a 54 year-old woman presenting for a NPV (Dr. Suero) for a history of chronic low back pain with radicular features. The patient notes a history of low back pain starting in July of 2023. The patient underwent a LEFT L4-L5, L5-S1 TFESI on 11/14/2023 which helped significantly with her low back and lower extremity pain. She notes 80% relief of pain and improvement in function for approximately 3+ months. She was able to return to normal function. However, approximately two weeks ago, she notes having recurrence of an aching pain in the low lumbar region with radiation to the bilateral anterolateral thighs to the anterior legs. No focal numbness or weakness in the lower extremities. No bowel or bladder incontinence or saddle anesthesia.  Pain is worse with standing and walking for long periods as well as transitioning from sitting to standing. She has been taking ibuprofen prn for her pain.  The patient states that average pain over the past week was 9/10 in severity. Mood: Patient notes having a history of depression/anxiety but is not actively depressed. Sleep: Patient notes difficulty with sleep 2/2 pain.    Dr. Suero: Interval HPI 11/29/2023 Pt returns s/p Left L4-L5, L5-S1 TFESI on 11/14/23 with 80% relief and improvement of ADLs. Patient denies loss of bowel/bladder function, new motor deficits, fevers, or chills.  Initial HPI 10/25/23 Ms. MATA is a 53 year old F who presents for initial evaluation for low back and leg pain. Pain started 8 weeks ago and pain rated 7/10 or higher. It is not associated with any inciting injury. Pain radiates to down the L leg. Pain is described as shooting and electric shock when radiating. Pain is worsened with sitting, coughing and bearing down. Patient has failed conservative treatment with acetaminophen, NSAIDs, muscle relaxants, and physical therapy/HEP. Pain is causing significant functional limitation resulting in diminished quality of life and impaired age appropriate ADL's. Patient denies loss of bowel/bladder function, new motor deficits, fevers, or chills. There is associated numbness/tingling. [] : no [FreeTextEntry7] : Left leg

## 2024-09-24 ENCOUNTER — APPOINTMENT (OUTPATIENT)
Dept: PAIN MANAGEMENT | Facility: CLINIC | Age: 54
End: 2024-09-24

## 2024-10-08 ENCOUNTER — APPOINTMENT (OUTPATIENT)
Dept: PAIN MANAGEMENT | Facility: CLINIC | Age: 54
End: 2024-10-08
Payer: COMMERCIAL

## 2024-10-08 DIAGNOSIS — M48.062 SPINAL STENOSIS, LUMBAR REGION WITH NEUROGENIC CLAUDICATION: ICD-10-CM

## 2024-10-08 DIAGNOSIS — M54.17 RADICULOPATHY, LUMBOSACRAL REGION: ICD-10-CM

## 2024-10-08 PROCEDURE — 64484 NJX AA&/STRD TFRM EPI L/S EA: CPT | Mod: LT

## 2024-10-08 PROCEDURE — 64483 NJX AA&/STRD TFRM EPI L/S 1: CPT | Mod: LT

## 2024-10-10 ENCOUNTER — APPOINTMENT (OUTPATIENT)
Dept: PAIN MANAGEMENT | Facility: CLINIC | Age: 54
End: 2024-10-10

## 2024-10-24 ENCOUNTER — APPOINTMENT (OUTPATIENT)
Dept: PAIN MANAGEMENT | Facility: CLINIC | Age: 54
End: 2024-10-24
Payer: COMMERCIAL

## 2024-10-24 VITALS — WEIGHT: 186 LBS | BODY MASS INDEX: 36.52 KG/M2 | HEIGHT: 60 IN

## 2024-10-24 DIAGNOSIS — M79.10 MYALGIA, UNSPECIFIED SITE: ICD-10-CM

## 2024-10-24 DIAGNOSIS — M48.062 SPINAL STENOSIS, LUMBAR REGION WITH NEUROGENIC CLAUDICATION: ICD-10-CM

## 2024-10-24 DIAGNOSIS — M54.17 RADICULOPATHY, LUMBOSACRAL REGION: ICD-10-CM

## 2024-10-24 PROCEDURE — 99214 OFFICE O/P EST MOD 30 MIN: CPT

## 2024-10-24 RX ORDER — GABAPENTIN 300 MG/1
300 CAPSULE ORAL
Qty: 30 | Refills: 1 | Status: ACTIVE | COMMUNITY
Start: 2024-10-24 | End: 1900-01-01

## 2024-12-19 ENCOUNTER — APPOINTMENT (OUTPATIENT)
Dept: PAIN MANAGEMENT | Facility: CLINIC | Age: 54
End: 2024-12-19
Payer: COMMERCIAL

## 2024-12-19 VITALS — HEIGHT: 60 IN | WEIGHT: 184 LBS | BODY MASS INDEX: 36.12 KG/M2

## 2024-12-19 DIAGNOSIS — M54.50 LOW BACK PAIN, UNSPECIFIED: ICD-10-CM

## 2024-12-19 DIAGNOSIS — M54.17 RADICULOPATHY, LUMBOSACRAL REGION: ICD-10-CM

## 2024-12-19 DIAGNOSIS — M79.10 MYALGIA, UNSPECIFIED SITE: ICD-10-CM

## 2024-12-19 DIAGNOSIS — M48.062 SPINAL STENOSIS, LUMBAR REGION WITH NEUROGENIC CLAUDICATION: ICD-10-CM

## 2024-12-19 PROCEDURE — 99214 OFFICE O/P EST MOD 30 MIN: CPT

## 2025-01-13 ENCOUNTER — APPOINTMENT (OUTPATIENT)
Dept: PAIN MANAGEMENT | Facility: CLINIC | Age: 55
End: 2025-01-13
Payer: COMMERCIAL

## 2025-01-13 VITALS — HEIGHT: 60 IN | WEIGHT: 186 LBS | BODY MASS INDEX: 36.52 KG/M2

## 2025-01-13 DIAGNOSIS — M48.062 SPINAL STENOSIS, LUMBAR REGION WITH NEUROGENIC CLAUDICATION: ICD-10-CM

## 2025-01-13 DIAGNOSIS — M79.10 MYALGIA, UNSPECIFIED SITE: ICD-10-CM

## 2025-01-13 DIAGNOSIS — M54.17 RADICULOPATHY, LUMBOSACRAL REGION: ICD-10-CM

## 2025-01-13 PROCEDURE — 99214 OFFICE O/P EST MOD 30 MIN: CPT

## 2025-01-28 ENCOUNTER — APPOINTMENT (OUTPATIENT)
Dept: PAIN MANAGEMENT | Facility: CLINIC | Age: 55
End: 2025-01-28
Payer: COMMERCIAL

## 2025-01-28 DIAGNOSIS — M54.17 RADICULOPATHY, LUMBOSACRAL REGION: ICD-10-CM

## 2025-01-28 DIAGNOSIS — M48.062 SPINAL STENOSIS, LUMBAR REGION WITH NEUROGENIC CLAUDICATION: ICD-10-CM

## 2025-01-28 PROCEDURE — 85610 PROTHROMBIN TIME: CPT | Mod: QW

## 2025-01-28 PROCEDURE — 64484 NJX AA&/STRD TFRM EPI L/S EA: CPT | Mod: 59,LT

## 2025-01-28 PROCEDURE — 64483 NJX AA&/STRD TFRM EPI L/S 1: CPT | Mod: LT

## 2025-02-10 ENCOUNTER — APPOINTMENT (OUTPATIENT)
Dept: PAIN MANAGEMENT | Facility: CLINIC | Age: 55
End: 2025-02-10
Payer: COMMERCIAL

## 2025-02-10 VITALS — HEIGHT: 60 IN | BODY MASS INDEX: 36.12 KG/M2 | WEIGHT: 184 LBS

## 2025-02-10 DIAGNOSIS — M54.50 LOW BACK PAIN, UNSPECIFIED: ICD-10-CM

## 2025-02-10 DIAGNOSIS — M48.062 SPINAL STENOSIS, LUMBAR REGION WITH NEUROGENIC CLAUDICATION: ICD-10-CM

## 2025-02-10 DIAGNOSIS — M79.10 MYALGIA, UNSPECIFIED SITE: ICD-10-CM

## 2025-02-10 DIAGNOSIS — M54.17 RADICULOPATHY, LUMBOSACRAL REGION: ICD-10-CM

## 2025-02-10 PROCEDURE — 99214 OFFICE O/P EST MOD 30 MIN: CPT

## 2025-02-10 RX ORDER — GABAPENTIN 600 MG/1
600 TABLET, COATED ORAL 3 TIMES DAILY
Qty: 90 | Refills: 1 | Status: ACTIVE | COMMUNITY
Start: 2025-02-10 | End: 1900-01-01

## 2025-03-10 ENCOUNTER — APPOINTMENT (OUTPATIENT)
Dept: PAIN MANAGEMENT | Facility: CLINIC | Age: 55
End: 2025-03-10
Payer: COMMERCIAL

## 2025-03-10 VITALS — WEIGHT: 191 LBS | BODY MASS INDEX: 37.5 KG/M2 | HEIGHT: 60 IN

## 2025-03-10 DIAGNOSIS — M79.18 MYALGIA, OTHER SITE: ICD-10-CM

## 2025-03-10 DIAGNOSIS — M79.10 MYALGIA, UNSPECIFIED SITE: ICD-10-CM

## 2025-03-10 DIAGNOSIS — M54.17 RADICULOPATHY, LUMBOSACRAL REGION: ICD-10-CM

## 2025-03-10 DIAGNOSIS — M48.062 SPINAL STENOSIS, LUMBAR REGION WITH NEUROGENIC CLAUDICATION: ICD-10-CM

## 2025-03-10 PROCEDURE — 99214 OFFICE O/P EST MOD 30 MIN: CPT

## 2025-05-10 ENCOUNTER — NON-APPOINTMENT (OUTPATIENT)
Age: 55
End: 2025-05-10

## 2025-05-22 ENCOUNTER — APPOINTMENT (OUTPATIENT)
Dept: PAIN MANAGEMENT | Facility: CLINIC | Age: 55
End: 2025-05-22
Payer: COMMERCIAL

## 2025-05-22 VITALS — HEIGHT: 60 IN | WEIGHT: 194 LBS | BODY MASS INDEX: 38.09 KG/M2

## 2025-05-22 DIAGNOSIS — M48.062 SPINAL STENOSIS, LUMBAR REGION WITH NEUROGENIC CLAUDICATION: ICD-10-CM

## 2025-05-22 DIAGNOSIS — M54.17 RADICULOPATHY, LUMBOSACRAL REGION: ICD-10-CM

## 2025-05-22 DIAGNOSIS — M79.10 MYALGIA, UNSPECIFIED SITE: ICD-10-CM

## 2025-05-22 PROCEDURE — 99214 OFFICE O/P EST MOD 30 MIN: CPT

## 2025-07-24 ENCOUNTER — APPOINTMENT (OUTPATIENT)
Dept: PAIN MANAGEMENT | Facility: CLINIC | Age: 55
End: 2025-07-24

## 2025-07-28 ENCOUNTER — APPOINTMENT (OUTPATIENT)
Dept: PAIN MANAGEMENT | Facility: CLINIC | Age: 55
End: 2025-07-28
Payer: COMMERCIAL

## 2025-07-28 DIAGNOSIS — M48.062 SPINAL STENOSIS, LUMBAR REGION WITH NEUROGENIC CLAUDICATION: ICD-10-CM

## 2025-07-28 DIAGNOSIS — M54.50 LOW BACK PAIN, UNSPECIFIED: ICD-10-CM

## 2025-07-28 DIAGNOSIS — M54.17 RADICULOPATHY, LUMBOSACRAL REGION: ICD-10-CM

## 2025-07-28 DIAGNOSIS — M79.10 MYALGIA, UNSPECIFIED SITE: ICD-10-CM

## 2025-07-28 PROCEDURE — 99214 OFFICE O/P EST MOD 30 MIN: CPT
